# Patient Record
Sex: FEMALE | Race: OTHER | HISPANIC OR LATINO | URBAN - METROPOLITAN AREA
[De-identification: names, ages, dates, MRNs, and addresses within clinical notes are randomized per-mention and may not be internally consistent; named-entity substitution may affect disease eponyms.]

---

## 2021-12-20 VITALS
HEIGHT: 65 IN | TEMPERATURE: 99 F | WEIGHT: 195.11 LBS | RESPIRATION RATE: 18 BRPM | HEART RATE: 51 BPM | OXYGEN SATURATION: 99 % | DIASTOLIC BLOOD PRESSURE: 62 MMHG | SYSTOLIC BLOOD PRESSURE: 124 MMHG

## 2021-12-20 RX ORDER — CHLORHEXIDINE GLUCONATE 213 G/1000ML
1 SOLUTION TOPICAL ONCE
Refills: 0 | Status: DISCONTINUED | OUTPATIENT
Start: 2021-12-22 | End: 2022-01-05

## 2021-12-20 NOTE — H&P ADULT - HISTORY OF PRESENT ILLNESS
COVID-19 PCR:   Cardiologist: Dr. Franc Andres  Pharmacist:  Escort:    70 yo female with a PMHx of multivessel CAD (s/p cardiac catheterization 10/2021) presented to cardiologist Dr. Andres with the complaint of intermittent substernal chest discomfort   described as a dull  when walking more than 2 blocks or climbing one flight of stairs associated with SOB relieved with rest.  COVID-19 PCR:   Cardiologist: Dr. Franc Andres  Pharmacist:  Escort:    70 yo female with a PMHx of known CAD s/p diagnostic cath (10/2021 in ) revealing 95% mLAD, 90%mLcx disease, hyperlipidemia, DM, IBS, HTN, hypothyroidism, presented to cardiologist Dr. Andres with the complaint of intermittent substernal chest discomfort described as a dull when walking more than 2 blocks and/or climbing one flight of stairs associated with SOB relieved with rest x several months. Patient denies leg edema, PND, orthopnea, palpitations, dizziness, syncope, N/V. Per MD office note Echo (6/2021) revealed normal BiV function, trace MR/TR, EF 63% and Carotid US (5/2021) revealed mild plaque, patent flows b/l. Due to patient’s known CAD and persistent CCS Angina Class III Symptoms despite medical therapy, patient now presents for cardiac catheterization with possible intervention if clinically indicated.     Cardiac Cath History  October 2021 in : LM mild, mLAD 95%, mLCx 90%, RCA PDA/PL diffuse disease, overall small diabetic appearing vessels, normal LVEF. Of note: Right radial approach aborted to R femoral, which ultimately led to large hematoma.   COVID-19 PCR: 12/17 Neg in chart  Cardiologist: Dr. Franc Andres  Pharmacy: [facility]  Escort: Daughter     Meds confirmed with pharmacy     72 yo Bangladeshi speaking female with a PMHx of known CAD s/p diagnostic cath (10/2021 in ) revealing 95% mLAD, 90%mLcx disease, hyperlipidemia, DM, IBS, HTN, hypothyroidism, presented to cardiologist Dr. Andres with the complaint of intermittent substernal chest discomfort described as a dull when walking more than 2 blocks and/or climbing one flight of stairs associated with SOB relieved with rest x several months. Patient denies leg edema, PND, orthopnea, palpitations, dizziness, syncope, N/V. Per MD office note Echo (6/2021) revealed normal BiV function, trace MR/TR, EF 63% and Carotid US (5/2021) revealed mild plaque, patent flows b/l. Due to patient’s known CAD and persistent CCS Angina Class III Symptoms despite medical therapy, patient now presents for cardiac catheterization with possible intervention if clinically indicated.     Cardiac Cath History  October 2021 in : LM mild, mLAD 95%, mLCx 90%, RCA PDA/PL diffuse disease, overall small diabetic appearing vessels, normal LVEF. Of note: Right radial approach aborted to R femoral, which ultimately led to large hematoma.

## 2021-12-20 NOTE — H&P ADULT - NSICDXPASTMEDICALHX_GEN_ALL_CORE_FT
PAST MEDICAL HISTORY:  CAD (coronary artery disease)      PAST MEDICAL HISTORY:  CAD (coronary artery disease)     Diabetes     Hyperlipidemia     Hypertension

## 2021-12-20 NOTE — H&P ADULT - RS GEN PE MLT RESP DETAILS PC
respirations non-labored/clear to auscultation bilaterally/no intercostal retractions/no rales/no rhonchi

## 2021-12-20 NOTE — H&P ADULT - ASSESSMENT
72 yo Bulgarian speaking female with a PMHx of known CAD s/p diagnostic cath (10/2021 in ) revealing 95% mLAD, 90%mLcx disease, hyperlipidemia, DM, IBS, HTN, hypothyroidism who in light of patient’s known CAD and persistent CCS Angina Class III Symptoms despite medical therapy, patient now presents for cardiac catheterization with possible intervention if clinically indicated.     ASA II, Mallampati II    Hgb/HCT: 10.9/35.5. (12.0/37.6 on 12/17 per outpatient labs). Pt denies bleeding, melena, BRBPR, hematuria. Pt reports compliance with daily Aspirin 81mg PO QD, last dose 12/20/21. Ordered home dose Aspirin and Plavix 600mg PO x 1.   NS @ 266 cc/hr ordered. EF 63% by ECHO. Pt is euvolemic on exam. Cr. 1.31. (1.4 by outpatient labs 12/17)    Pt is a candidate for moderate sedation: Yes    Risks & benefits of procedure and alternative therapy have been explained to the patient including but not limited to: allergic reaction, bleeding w/possible need for blood transfusion, infection, renal and vascular compromise, limb damage, arrhythmia, stroke, vessel dissection/perforation, Myocardial infarction, emergent CABG. Informed consent obtained and in chart.

## 2021-12-20 NOTE — H&P ADULT - NSHPLABSRESULTS_GEN_ALL_CORE
ECG: SB @ 50bpm with 1st degree AVB, TWI in I-II, V3-V6, biphasic T in V2.                          10.9   10.69 )-----------( 372      ( 22 Dec 2021 12:16 )             35.5       12-22    136  |  99  |  30<H>  ----------------------------<  123<H>  4.6   |  28  |  1.31<H>    Ca    9.4      22 Dec 2021 12:16    TPro  7.4  /  Alb  4.5  /  TBili  0.2  /  DBili  x   /  AST  27  /  ALT  20  /  AlkPhos  62  12-22      PT/INR - ( 22 Dec 2021 12:16 )   PT: 11.7 sec;   INR: 0.97          PTT - ( 22 Dec 2021 12:16 )  PTT:29.5 sec    CARDIAC MARKERS ( 22 Dec 2021 12:16 )  x     / x     / 101 U/L / x     / x

## 2021-12-22 ENCOUNTER — OUTPATIENT (OUTPATIENT)
Dept: OUTPATIENT SERVICES | Facility: HOSPITAL | Age: 71
LOS: 1 days | Discharge: HOME | End: 2021-12-22
Payer: MEDICARE

## 2021-12-22 DIAGNOSIS — Z98.890 OTHER SPECIFIED POSTPROCEDURAL STATES: Chronic | ICD-10-CM

## 2021-12-22 DIAGNOSIS — Z98.891 HISTORY OF UTERINE SCAR FROM PREVIOUS SURGERY: Chronic | ICD-10-CM

## 2021-12-22 LAB
A1C WITH ESTIMATED AVERAGE GLUCOSE RESULT: 7.3 % — HIGH (ref 4–5.6)
ALBUMIN SERPL ELPH-MCNC: 4.5 G/DL — SIGNIFICANT CHANGE UP (ref 3.3–5)
ALP SERPL-CCNC: 62 U/L — SIGNIFICANT CHANGE UP (ref 40–120)
ALT FLD-CCNC: 20 U/L — SIGNIFICANT CHANGE UP (ref 10–45)
ANION GAP SERPL CALC-SCNC: 7 MMOL/L — SIGNIFICANT CHANGE UP (ref 5–17)
ANION GAP SERPL CALC-SCNC: 9 MMOL/L — SIGNIFICANT CHANGE UP (ref 5–17)
APTT BLD: 29.5 SEC — SIGNIFICANT CHANGE UP (ref 27.5–35.5)
AST SERPL-CCNC: 27 U/L — SIGNIFICANT CHANGE UP (ref 10–40)
BASOPHILS # BLD AUTO: 0.04 K/UL — SIGNIFICANT CHANGE UP (ref 0–0.2)
BASOPHILS NFR BLD AUTO: 0.4 % — SIGNIFICANT CHANGE UP (ref 0–2)
BILIRUB SERPL-MCNC: 0.2 MG/DL — SIGNIFICANT CHANGE UP (ref 0.2–1.2)
BUN SERPL-MCNC: 23 MG/DL — SIGNIFICANT CHANGE UP (ref 7–23)
BUN SERPL-MCNC: 30 MG/DL — HIGH (ref 7–23)
CALCIUM SERPL-MCNC: 7.2 MG/DL — LOW (ref 8.4–10.5)
CALCIUM SERPL-MCNC: 9.4 MG/DL — SIGNIFICANT CHANGE UP (ref 8.4–10.5)
CHLORIDE SERPL-SCNC: 108 MMOL/L — SIGNIFICANT CHANGE UP (ref 96–108)
CHLORIDE SERPL-SCNC: 99 MMOL/L — SIGNIFICANT CHANGE UP (ref 96–108)
CHOLEST SERPL-MCNC: 95 MG/DL — SIGNIFICANT CHANGE UP
CK SERPL-CCNC: 101 U/L — SIGNIFICANT CHANGE UP (ref 25–170)
CO2 SERPL-SCNC: 24 MMOL/L — SIGNIFICANT CHANGE UP (ref 22–31)
CO2 SERPL-SCNC: 28 MMOL/L — SIGNIFICANT CHANGE UP (ref 22–31)
CREAT SERPL-MCNC: 1.16 MG/DL — SIGNIFICANT CHANGE UP (ref 0.5–1.3)
CREAT SERPL-MCNC: 1.31 MG/DL — HIGH (ref 0.5–1.3)
CRP SERPL-MCNC: <3 MG/L — SIGNIFICANT CHANGE UP (ref 0–4)
EOSINOPHIL # BLD AUTO: 0.27 K/UL — SIGNIFICANT CHANGE UP (ref 0–0.5)
EOSINOPHIL NFR BLD AUTO: 2.5 % — SIGNIFICANT CHANGE UP (ref 0–6)
ESTIMATED AVERAGE GLUCOSE: 163 MG/DL — HIGH (ref 68–114)
GLUCOSE BLDC GLUCOMTR-MCNC: 62 MG/DL — LOW (ref 70–99)
GLUCOSE SERPL-MCNC: 123 MG/DL — HIGH (ref 70–99)
GLUCOSE SERPL-MCNC: 155 MG/DL — HIGH (ref 70–99)
HCT VFR BLD CALC: 32.5 % — LOW (ref 34.5–45)
HCT VFR BLD CALC: 35.5 % — SIGNIFICANT CHANGE UP (ref 34.5–45)
HCV AB S/CO SERPL IA: 0.04 S/CO — SIGNIFICANT CHANGE UP
HCV AB SERPL-IMP: SIGNIFICANT CHANGE UP
HDLC SERPL-MCNC: 47 MG/DL — LOW
HGB BLD-MCNC: 10.1 G/DL — LOW (ref 11.5–15.5)
HGB BLD-MCNC: 10.9 G/DL — LOW (ref 11.5–15.5)
IMM GRANULOCYTES NFR BLD AUTO: 0.3 % — SIGNIFICANT CHANGE UP (ref 0–1.5)
INR BLD: 0.97 — SIGNIFICANT CHANGE UP (ref 0.88–1.16)
LIPID PNL WITH DIRECT LDL SERPL: 23 MG/DL — SIGNIFICANT CHANGE UP
LYMPHOCYTES # BLD AUTO: 3.77 K/UL — HIGH (ref 1–3.3)
LYMPHOCYTES # BLD AUTO: 35.3 % — SIGNIFICANT CHANGE UP (ref 13–44)
MAGNESIUM SERPL-MCNC: 1.6 MG/DL — SIGNIFICANT CHANGE UP (ref 1.6–2.6)
MCHC RBC-ENTMCNC: 28.4 PG — SIGNIFICANT CHANGE UP (ref 27–34)
MCHC RBC-ENTMCNC: 28.9 PG — SIGNIFICANT CHANGE UP (ref 27–34)
MCHC RBC-ENTMCNC: 30.7 GM/DL — LOW (ref 32–36)
MCHC RBC-ENTMCNC: 31.1 GM/DL — LOW (ref 32–36)
MCV RBC AUTO: 92.4 FL — SIGNIFICANT CHANGE UP (ref 80–100)
MCV RBC AUTO: 93.1 FL — SIGNIFICANT CHANGE UP (ref 80–100)
MONOCYTES # BLD AUTO: 0.57 K/UL — SIGNIFICANT CHANGE UP (ref 0–0.9)
MONOCYTES NFR BLD AUTO: 5.3 % — SIGNIFICANT CHANGE UP (ref 2–14)
NEUTROPHILS # BLD AUTO: 6.01 K/UL — SIGNIFICANT CHANGE UP (ref 1.8–7.4)
NEUTROPHILS NFR BLD AUTO: 56.2 % — SIGNIFICANT CHANGE UP (ref 43–77)
NON HDL CHOLESTEROL: 48 MG/DL — SIGNIFICANT CHANGE UP
NRBC # BLD: 0 /100 WBCS — SIGNIFICANT CHANGE UP (ref 0–0)
NRBC # BLD: 0 /100 WBCS — SIGNIFICANT CHANGE UP (ref 0–0)
PLATELET # BLD AUTO: 330 K/UL — SIGNIFICANT CHANGE UP (ref 150–400)
PLATELET # BLD AUTO: 372 K/UL — SIGNIFICANT CHANGE UP (ref 150–400)
POTASSIUM SERPL-MCNC: 3.5 MMOL/L — SIGNIFICANT CHANGE UP (ref 3.5–5.3)
POTASSIUM SERPL-MCNC: 4.6 MMOL/L — SIGNIFICANT CHANGE UP (ref 3.5–5.3)
POTASSIUM SERPL-SCNC: 3.5 MMOL/L — SIGNIFICANT CHANGE UP (ref 3.5–5.3)
POTASSIUM SERPL-SCNC: 4.6 MMOL/L — SIGNIFICANT CHANGE UP (ref 3.5–5.3)
PROT SERPL-MCNC: 7.4 G/DL — SIGNIFICANT CHANGE UP (ref 6–8.3)
PROTHROM AB SERPL-ACNC: 11.7 SEC — SIGNIFICANT CHANGE UP (ref 10.6–13.6)
RBC # BLD: 3.49 M/UL — LOW (ref 3.8–5.2)
RBC # BLD: 3.84 M/UL — SIGNIFICANT CHANGE UP (ref 3.8–5.2)
RBC # FLD: 14.1 % — SIGNIFICANT CHANGE UP (ref 10.3–14.5)
RBC # FLD: 14.3 % — SIGNIFICANT CHANGE UP (ref 10.3–14.5)
SODIUM SERPL-SCNC: 136 MMOL/L — SIGNIFICANT CHANGE UP (ref 135–145)
SODIUM SERPL-SCNC: 139 MMOL/L — SIGNIFICANT CHANGE UP (ref 135–145)
TRIGL SERPL-MCNC: 124 MG/DL — SIGNIFICANT CHANGE UP
WBC # BLD: 10.69 K/UL — HIGH (ref 3.8–10.5)
WBC # BLD: 8.54 K/UL — SIGNIFICANT CHANGE UP (ref 3.8–10.5)
WBC # FLD AUTO: 10.69 K/UL — HIGH (ref 3.8–10.5)
WBC # FLD AUTO: 8.54 K/UL — SIGNIFICANT CHANGE UP (ref 3.8–10.5)

## 2021-12-22 PROCEDURE — 82550 ASSAY OF CK (CPK): CPT

## 2021-12-22 PROCEDURE — 92928 PRQ TCAT PLMT NTRAC ST 1 LES: CPT | Mod: 1L,LD

## 2021-12-22 PROCEDURE — C1874: CPT

## 2021-12-22 PROCEDURE — 86803 HEPATITIS C AB TEST: CPT

## 2021-12-22 PROCEDURE — C1725: CPT

## 2021-12-22 PROCEDURE — 93458 L HRT ARTERY/VENTRICLE ANGIO: CPT | Mod: 59

## 2021-12-22 PROCEDURE — C1887: CPT

## 2021-12-22 PROCEDURE — 36415 COLL VENOUS BLD VENIPUNCTURE: CPT

## 2021-12-22 PROCEDURE — 93005 ELECTROCARDIOGRAM TRACING: CPT

## 2021-12-22 PROCEDURE — 99152 MOD SED SAME PHYS/QHP 5/>YRS: CPT

## 2021-12-22 PROCEDURE — 83735 ASSAY OF MAGNESIUM: CPT

## 2021-12-22 PROCEDURE — 80053 COMPREHEN METABOLIC PANEL: CPT

## 2021-12-22 PROCEDURE — 86140 C-REACTIVE PROTEIN: CPT

## 2021-12-22 PROCEDURE — 85027 COMPLETE CBC AUTOMATED: CPT

## 2021-12-22 PROCEDURE — 82962 GLUCOSE BLOOD TEST: CPT

## 2021-12-22 PROCEDURE — 99153 MOD SED SAME PHYS/QHP EA: CPT

## 2021-12-22 PROCEDURE — 85730 THROMBOPLASTIN TIME PARTIAL: CPT

## 2021-12-22 PROCEDURE — 99152 MOD SED SAME PHYS/QHP 5/>YRS: CPT | Mod: 1L

## 2021-12-22 PROCEDURE — 80061 LIPID PANEL: CPT

## 2021-12-22 PROCEDURE — 85025 COMPLETE CBC W/AUTO DIFF WBC: CPT

## 2021-12-22 PROCEDURE — 85610 PROTHROMBIN TIME: CPT

## 2021-12-22 PROCEDURE — 93458 L HRT ARTERY/VENTRICLE ANGIO: CPT | Mod: 26,1L,59

## 2021-12-22 PROCEDURE — C1769: CPT

## 2021-12-22 PROCEDURE — 92978 ENDOLUMINL IVUS OCT C 1ST: CPT | Mod: LD

## 2021-12-22 PROCEDURE — 92978 ENDOLUMINL IVUS OCT C 1ST: CPT | Mod: 26,1L,LD

## 2021-12-22 PROCEDURE — C9600: CPT | Mod: LD

## 2021-12-22 PROCEDURE — C1753: CPT

## 2021-12-22 PROCEDURE — 83036 HEMOGLOBIN GLYCOSYLATED A1C: CPT

## 2021-12-22 PROCEDURE — 93010 ELECTROCARDIOGRAM REPORT: CPT | Mod: 76

## 2021-12-22 PROCEDURE — 80048 BASIC METABOLIC PNL TOTAL CA: CPT

## 2021-12-22 RX ORDER — ASPIRIN/CALCIUM CARB/MAGNESIUM 324 MG
1 TABLET ORAL
Qty: 0 | Refills: 0 | DISCHARGE

## 2021-12-22 RX ORDER — SODIUM CHLORIDE 9 MG/ML
1000 INJECTION, SOLUTION INTRAVENOUS
Refills: 0 | Status: DISCONTINUED | OUTPATIENT
Start: 2021-12-22 | End: 2022-01-05

## 2021-12-22 RX ORDER — DEXTROSE 50 % IN WATER 50 %
25 SYRINGE (ML) INTRAVENOUS ONCE
Refills: 0 | Status: DISCONTINUED | OUTPATIENT
Start: 2021-12-22 | End: 2022-01-05

## 2021-12-22 RX ORDER — GLUCAGON INJECTION, SOLUTION 0.5 MG/.1ML
1 INJECTION, SOLUTION SUBCUTANEOUS ONCE
Refills: 0 | Status: DISCONTINUED | OUTPATIENT
Start: 2021-12-22 | End: 2022-01-05

## 2021-12-22 RX ORDER — ASPIRIN/CALCIUM CARB/MAGNESIUM 324 MG
1 TABLET ORAL
Qty: 30 | Refills: 11
Start: 2021-12-22 | End: 2022-12-16

## 2021-12-22 RX ORDER — SODIUM CHLORIDE 9 MG/ML
500 INJECTION INTRAMUSCULAR; INTRAVENOUS; SUBCUTANEOUS
Refills: 0 | Status: DISCONTINUED | OUTPATIENT
Start: 2021-12-22 | End: 2022-01-05

## 2021-12-22 RX ORDER — DOCUSATE SODIUM 100 MG
1 CAPSULE ORAL
Qty: 0 | Refills: 0 | DISCHARGE

## 2021-12-22 RX ORDER — DEXTROSE 50 % IN WATER 50 %
15 SYRINGE (ML) INTRAVENOUS ONCE
Refills: 0 | Status: DISCONTINUED | OUTPATIENT
Start: 2021-12-22 | End: 2022-01-05

## 2021-12-22 RX ORDER — CLOPIDOGREL BISULFATE 75 MG/1
600 TABLET, FILM COATED ORAL ONCE
Refills: 0 | Status: COMPLETED | OUTPATIENT
Start: 2021-12-22 | End: 2021-12-22

## 2021-12-22 RX ORDER — CLOPIDOGREL BISULFATE 75 MG/1
0 TABLET, FILM COATED ORAL
Qty: 30 | Refills: 11
Start: 2021-12-22 | End: 2022-12-16

## 2021-12-22 RX ORDER — POLYETHYLENE GLYCOL 3350 17 G/17G
0 POWDER, FOR SOLUTION ORAL
Qty: 0 | Refills: 0 | DISCHARGE

## 2021-12-22 RX ORDER — ASPIRIN/CALCIUM CARB/MAGNESIUM 324 MG
81 TABLET ORAL ONCE
Refills: 0 | Status: COMPLETED | OUTPATIENT
Start: 2021-12-22 | End: 2021-12-22

## 2021-12-22 RX ORDER — INSULIN LISPRO 100/ML
VIAL (ML) SUBCUTANEOUS ONCE
Refills: 0 | Status: DISCONTINUED | OUTPATIENT
Start: 2021-12-22 | End: 2022-01-05

## 2021-12-22 RX ORDER — INSULIN GLARGINE 100 [IU]/ML
0 INJECTION, SOLUTION SUBCUTANEOUS
Qty: 0 | Refills: 0 | DISCHARGE

## 2021-12-22 RX ORDER — LINACLOTIDE 145 UG/1
1 CAPSULE, GELATIN COATED ORAL
Qty: 0 | Refills: 0 | DISCHARGE

## 2021-12-22 RX ORDER — CLOPIDOGREL BISULFATE 75 MG/1
1 TABLET, FILM COATED ORAL
Qty: 30 | Refills: 11
Start: 2021-12-22 | End: 2022-12-16

## 2021-12-22 RX ORDER — DEXTROSE 50 % IN WATER 50 %
12.5 SYRINGE (ML) INTRAVENOUS ONCE
Refills: 0 | Status: DISCONTINUED | OUTPATIENT
Start: 2021-12-22 | End: 2022-01-05

## 2021-12-22 RX ADMIN — SODIUM CHLORIDE 266 MILLILITER(S): 9 INJECTION INTRAMUSCULAR; INTRAVENOUS; SUBCUTANEOUS at 13:23

## 2021-12-22 RX ADMIN — CLOPIDOGREL BISULFATE 600 MILLIGRAM(S): 75 TABLET, FILM COATED ORAL at 13:22

## 2021-12-22 RX ADMIN — Medication 81 MILLIGRAM(S): at 13:22

## 2021-12-22 NOTE — PROGRESS NOTE ADULT - SUBJECTIVE AND OBJECTIVE BOX
Interventional Cardiology PA Post PCI SDA Discharge Note    Patient without complaints. Ambulated and voided without difficulties    Afebrile, VSS    PRESCRIPTIONS/HOME MEDICATIONS:  Aspirin Enteric Coated 81 mg oral delayed release tablet: 1 tab(s) orally once a day  Basaglar KwikPen: 30 unit(s) subcutaneous once a day (at bedtime)  Coreg 12.5 mg oral tablet: 1 tab(s) orally 2 times a day  Crestor 20 mg oral tablet: 1 tab(s) orally once a day  hydrALAZINE 25 mg oral tablet: 1 tab(s) orally 2 times a day  levothyroxine 150 mcg (0.15 mg) oral capsule: 1 cap(s) orally once a day  Linzess 145 mcg oral capsule: 1 cap(s) orally once a day  losartan 100 mg oral tablet: 1 tab(s) orally once a day  magnesium oxide 140 mg oral capsule: 1 cap(s) orally 3 times a day  meclizine 25 mg oral tablet: 1 tab(s) orally 2 times a day  Norvasc 10 mg oral tablet: 1 tab(s) orally once a day  omeprazole 40 mg oral delayed release capsule: 1 cap(s) orally once a day  Vitamin D3 25 mcg (1000 intl units) oral tablet: 1 tab(s) orally once a day  Zetia 10 mg oral tablet: 1 tab(s) orally once a day      CURRENT MEDICATIONS:   chlorhexidine 4% Liquid 1 Application(s) Topical once  dextrose 40% Gel 15 Gram(s) Oral Once  dextrose 5%. 1000 milliLiter(s) IV Continuous <Continuous>  dextrose 5%. 1000 milliLiter(s) IV Continuous <Continuous>  dextrose 50% Injectable 25 Gram(s) IV Push Once  dextrose 50% Injectable 12.5 Gram(s) IV Push Once  dextrose 50% Injectable 25 Gram(s) IV Push Once  glucagon  Injectable 1 milliGRAM(s) IntraMuscular Once  insulin lispro (ADMELOG) corrective regimen sliding scale   SubCutaneous Once  sodium chloride 0.9%. 500 milliLiter(s) IV Continuous <Continuous>  sodium chloride 0.9%. 500 milliLiter(s) IV Continuous <Continuous>      Ext:  Right Radial : No hematoma,     bleeding, dressing; C/D/I    Pulses:    intact RADto baseline     A/P: 72 yo Maldivian speaking female with a PMHx of known CAD s/p diagnostic cath (10/2021 in ) revealing 95% mLAD, 90%mLcx disease, hyperlipidemia, DM, IBS, HTN, hypothyroidism, presented to cardiologist Dr. Andres with the complaint of intermittent substernal chest discomfort described as a dull when walking more than 2 blocks and/or climbing one flight of stairs associated with SOB relieved with rest x several months. Patient denies leg edema, PND, orthopnea, palpitations, dizziness, syncope, N/V. Per MD office note Echo (6/2021) revealed normal BiV function, trace MR/TR, EF 63% and Carotid US (5/2021) revealed mild plaque, patent flows b/l. Due to patient’s known CAD and persistent CCS Angina Class III Symptoms despite medical therapy, patient now presents for cardiac catheterization with possible intervention if clinically indicated.     Patient is s/p PCI: EVELIO x 1 mLAD (90%) diffuse, LM: normal, pLAD: 30% ectatic, dLAD: mild diffuse disease, pLCx: 70% diffuse, pOM1: 95% small average vessel, OM2: 80% stenosis, Ramus: 2 tandem 80% stenosis, RCA: mild diffuse disease, RPDA: fistally diffuse severe disease, RPL: distally diffuse severe disease, EDP: 12 mmHg, No LV-Ao gram. EF 63% as per echo. R TR due at 6:30 PM.     1.       Follow-up with Cardiologist, Dr. Andres,  in 72 hours.  2.       Post procedure labs/EKG reviewed and stable.    3.       Pt given instructions on importance of taking antiplatelet medication.    4.       Stable for discharge as per attending Dr. Andres, after bed rest, pt voids, wrist stable and 30 minutes of ambulation.  5.        Prescriptions for Aspirin/Plavix e-prescribed and submitted to patient's pharmacy Yes  6.        Patient will continue Statin - Crestor 10 mg and Zetia 10 mg  7.       Patient will continue All Other Home Medications.   8.       Is patient a Current Smoker: No?   9.	   Discharge forms signed and copies in chart   10.       Discharge Order Entered Yes

## 2021-12-27 DIAGNOSIS — I25.110 ATHEROSCLEROTIC HEART DISEASE OF NATIVE CORONARY ARTERY WITH UNSTABLE ANGINA PECTORIS: ICD-10-CM

## 2021-12-27 DIAGNOSIS — I25.84 CORONARY ATHEROSCLEROSIS DUE TO CALCIFIED CORONARY LESION: ICD-10-CM

## 2022-02-12 PROBLEM — E11.9 TYPE 2 DIABETES MELLITUS WITHOUT COMPLICATIONS: Chronic | Status: ACTIVE | Noted: 2021-12-20

## 2022-02-12 PROBLEM — I10 ESSENTIAL (PRIMARY) HYPERTENSION: Chronic | Status: ACTIVE | Noted: 2021-12-20

## 2022-02-12 PROBLEM — I25.10 ATHEROSCLEROTIC HEART DISEASE OF NATIVE CORONARY ARTERY WITHOUT ANGINA PECTORIS: Chronic | Status: ACTIVE | Noted: 2021-12-20

## 2022-02-12 PROBLEM — E78.5 HYPERLIPIDEMIA, UNSPECIFIED: Chronic | Status: ACTIVE | Noted: 2021-12-20

## 2022-02-15 VITALS
TEMPERATURE: 98 F | HEART RATE: 65 BPM | WEIGHT: 197.98 LBS | DIASTOLIC BLOOD PRESSURE: 67 MMHG | OXYGEN SATURATION: 98 % | SYSTOLIC BLOOD PRESSURE: 121 MMHG | HEIGHT: 65 IN | RESPIRATION RATE: 18 BRPM

## 2022-02-15 RX ORDER — CHLORHEXIDINE GLUCONATE 213 G/1000ML
1 SOLUTION TOPICAL ONCE
Refills: 0 | Status: DISCONTINUED | OUTPATIENT
Start: 2022-02-23 | End: 2022-03-09

## 2022-02-15 NOTE — H&P ADULT - HISTORY OF PRESENT ILLNESS
History obtained from last hospitalization note & outpatient [facility] Cardiology note    COVID-19 PCR:   Cardiologist: Dr. Franc Andres  Pharmacy: [facility]  Escort: Daughter     Meds confirmed with pharmacy last admit, confirm that meds haven't changed    71 y/o Indonesian speaking F, PMHx HTN, HLD, DM, CAD (last cath @ [facility] 12/22/21: m/d LAD 90% s/p EVELIO x2 with residual mLCx 80% just after severely diffused OM1 extending into larger OM2 80%, p/m Ramus 90%, RPDA & RPLs with p/m vessel 70-90%), IBS, hypothyroidism, who initially presented to Cardiologist Dr. Andres c/o intermittent, CCP described as dull l when walking >2 blocks and/or climbing one flight of stairs a/w GUAMAN, relieved with rest x several months.  Since last procedure, pt continues to endorse CP/GUAMAN on >2 city blocks. Per MD office note Echo (6/2021) revealed normal BiV function, trace MR/TR, EF 63% and Carotid US (5/2021) revealed mild plaque, patent flows b/l. In light of pt's risk factors, persistent CCS Angina Class II Symptoms despite medical therapy, known physiologically significant CAD,  patient presented for staged PCI of LCx & Ramus followed by rPDA/rPL at later date.     with residual mLCx 80% just after severely diffused OM1 extending into larger OM2 80%, p/m Ramus 90%, RPDA & RPLs with p/m vessel 70-90%)    Cardiac Cath History  October 2021 in DR: LM mild, mLAD 95%, mLCx 90%, RCA PDA/PL diffuse disease, overall small diabetic appearing vessels, normal LVEF. Of note: Right radial approach aborted to R femoral, which ultimately led to large hematoma. History obtained from last hospitalization note & outpatient [facility] Cardiology note    COVID-19 PCR:   Cardiologist: Dr. Franc Andres  Pharmacy: [facility]  Escort: Daughter     73 y/o Mohawk speaking F, PMHx HTN, HLD, DM, CAD (last cath @ [facility] 12/22/21: m/d LAD 90% s/p EVELIO x2 with residual mLCx 80% just after severely diffused OM1 extending into larger OM2 80%, p/m Ramus 90%, RPDA & RPLs with p/m vessel 70-90%), IBS, hypothyroidism, who initially presented to Cardiologist Dr. Andres c/o intermittent, CCP described as dull l when walking >2 blocks and/or climbing one flight of stairs a/w GUAMAN, relieved with rest x several months.  Since last procedure, pt continues to endorse CP/GUAMAN on >2 city blocks. Per MD office note Echo (6/2021) revealed normal BiV function, trace MR/TR, EF 63% and Carotid US (5/2021) revealed mild plaque, patent flows b/l. In light of pt's risk factors, persistent CCS Angina Class II Symptoms despite medical therapy, known physiologically significant CAD,  patient presented for staged PCI of LCx & Ramus followed by rPDA/rPL at later date.     with residual mLCx 80% just after severely diffused OM1 extending into larger OM2 80%, p/m Ramus 90%, RPDA & RPLs with p/m vessel 70-90%)    Cardiac Cath History  October 2021 in DR: LM mild, mLAD 95%, mLCx 90%, RCA PDA/PL diffuse disease, overall small diabetic appearing vessels, normal LVEF. Of note: Right radial approach aborted to R femoral, which ultimately led to large hematoma. History obtained from last hospitalization note & outpatient [facility] Cardiology note    COVID-19 PCR: Neg 2/19/2022 (in chart)  Cardiologist: Dr. Franc Andres  [facility]: [facility]  Escort: Daughter     73 y/o Azeri speaking F, PMHx HTN, HLD, DM, CAD (last cath @ [facility] 12/22/21: m/d LAD 90% s/p EVELIO x2 with residual mLCx 80% just after severely diffused OM1 extending into larger OM2 80%, p/m Ramus 90%, RPDA & RPLs with p/m vessel 70-90%), IBS, hypothyroidism, who initially presented to Cardiologist Dr. Andres c/o intermittent, CCP described as dull l when walking >2 blocks and/or climbing one flight of stairs a/w GUAMAN, relieved with rest x several months.  Since last procedure, pt continues to endorse CP/GUAMAN on >2 city blocks. Per MD office note Echo (6/2021) revealed normal BiV function, trace MR/TR, EF 63% and Carotid US (5/2021) revealed mild plaque, patent flows b/l. In light of pt's risk factors, persistent CCS Angina Class II Symptoms despite medical therapy, known physiologically significant CAD,  patient presented for staged PCI of LCx & Ramus followed by rPDA/rPL at later date.     with residual mLCx 80% just after severely diffused OM1 extending into larger OM2 80%, p/m Ramus 90%, RPDA & RPLs with p/m vessel 70-90%)    Cardiac Cath History  October 2021 in DR: LM mild, mLAD 95%, mLCx 90%, RCA PDA/PL diffuse disease, overall small diabetic appearing vessels, normal LVEF. Of note: Right radial approach aborted to R femoral, which ultimately led to large hematoma.

## 2022-02-15 NOTE — H&P ADULT - NSICDXPASTMEDICALHX_GEN_ALL_CORE_FT
PAST MEDICAL HISTORY:  CAD (coronary artery disease)     Diabetes     Hyperlipidemia     Hypertension

## 2022-02-15 NOTE — H&P ADULT - ASSESSMENT
71 y/o Divehi speaking F, PMHx HTN, HLD, DM, CAD (last cath @ [facility] 12/22/21: m/d LAD 90% s/p EVELIO x2 with residual mLCx 80% just after severely diffused OM1 extending into larger OM2 80%, p/m Ramus 90%, RPDA & RPLs with p/m vessel 70-90%), IBS, hypothyroidism, who initially presented to the cardiac cath for stage PCI of LCx and Ramus in light of pt's risk factors, persistent CCS Angina Class II Symptoms despite medical therapy, known physiologically significant CAD.         ASA III			Mallampati class: III         Anginal Class: II    Consent obtained with Language Line # [id number]  Sedation Plan:   [  ] None   [x] Moderate   [  ]  Deep    [  ]  General Anesthesia   Patient Is Suitable Candidate For Sedation?     [x] Yes   [  ] No   [  ] Not Applicable   Cath Order Entered: [x] Yes  DAPT LOAD Ordered: [  ] Yes  [x] No load 2/2 pt takes Aspirin 81mg daily, last dose yesterday 2/22/2022 and takes Brilinta 90mg BID, last dose this morning, give Aspirin 81mg x 1. H/H 12.21/39.4 stable   Pre-Cath fluids Ordered: [x] Yes  BUN/Cr 26/1.27, IVF NS @ 270cc/hr x 2 hour     Risks & benefits of procedure and sedation and risks and benefits for the alternative therapy have been explained to the patient and/or HCP in layman’s terms including but not limited to: allergic reaction, bleeding, infection, arrhythmia, respiratory compromise, renal and vascular compromise, limb damage, MI, CVA, emergent CABG/Vascular Surgery and death. Informed consent obtained and in chart.

## 2022-02-15 NOTE — H&P ADULT - NSHPLABSRESULTS_GEN_ALL_CORE
12.2   9.51  )-----------( 355      ( 23 Feb 2022 11:10 )             39.4       02-23    142  |  104  |  26<H>  ----------------------------<  79  4.3   |  28  |  1.27    Ca    9.5      23 Feb 2022 11:10    TPro  7.5  /  Alb  4.6  /  TBili  0.2  /  DBili  x   /  AST  18  /  ALT  14  /  AlkPhos  62  02-23          CARDIAC MARKERS ( 23 Feb 2022 11:10 )  x     / x     / 85 U/L / x     / 1.8 ng/mL            EKG: NSR with 1st degree 12.2   9.51  )-----------( 355      ( 23 Feb 2022 11:10 )             39.4       02-23    142  |  104  |  26<H>  ----------------------------<  79  4.3   |  28  |  1.27    Ca    9.5      23 Feb 2022 11:10    TPro  7.5  /  Alb  4.6  /  TBili  0.2  /  DBili  x   /  AST  18  /  ALT  14  /  AlkPhos  62  02-23          CARDIAC MARKERS ( 23 Feb 2022 11:10 )  x     / x     / 85 U/L / x     / 1.8 ng/mL            EKG: NSR with 1st degree, Inverted Twave v5

## 2022-02-23 ENCOUNTER — OUTPATIENT (OUTPATIENT)
Dept: OUTPATIENT SERVICES | Facility: HOSPITAL | Age: 72
LOS: 1 days | Discharge: HOME | End: 2022-02-23
Payer: MEDICARE

## 2022-02-23 DIAGNOSIS — Z98.891 HISTORY OF UTERINE SCAR FROM PREVIOUS SURGERY: Chronic | ICD-10-CM

## 2022-02-23 DIAGNOSIS — Z98.890 OTHER SPECIFIED POSTPROCEDURAL STATES: Chronic | ICD-10-CM

## 2022-02-23 LAB
A1C WITH ESTIMATED AVERAGE GLUCOSE RESULT: 7.4 % — HIGH (ref 4–5.6)
ALBUMIN SERPL ELPH-MCNC: 4.6 G/DL — SIGNIFICANT CHANGE UP (ref 3.3–5)
ALP SERPL-CCNC: 62 U/L — SIGNIFICANT CHANGE UP (ref 40–120)
ALT FLD-CCNC: 14 U/L — SIGNIFICANT CHANGE UP (ref 10–45)
ANION GAP SERPL CALC-SCNC: 10 MMOL/L — SIGNIFICANT CHANGE UP (ref 5–17)
ANION GAP SERPL CALC-SCNC: 11 MMOL/L — SIGNIFICANT CHANGE UP (ref 5–17)
APTT BLD: 29.4 SEC — SIGNIFICANT CHANGE UP (ref 27.5–35.5)
AST SERPL-CCNC: 18 U/L — SIGNIFICANT CHANGE UP (ref 10–40)
BASOPHILS # BLD AUTO: 0.03 K/UL — SIGNIFICANT CHANGE UP (ref 0–0.2)
BASOPHILS NFR BLD AUTO: 0.3 % — SIGNIFICANT CHANGE UP (ref 0–2)
BILIRUB SERPL-MCNC: 0.2 MG/DL — SIGNIFICANT CHANGE UP (ref 0.2–1.2)
BUN SERPL-MCNC: 23 MG/DL — SIGNIFICANT CHANGE UP (ref 7–23)
BUN SERPL-MCNC: 26 MG/DL — HIGH (ref 7–23)
CALCIUM SERPL-MCNC: 9.1 MG/DL — SIGNIFICANT CHANGE UP (ref 8.4–10.5)
CALCIUM SERPL-MCNC: 9.5 MG/DL — SIGNIFICANT CHANGE UP (ref 8.4–10.5)
CHLORIDE SERPL-SCNC: 102 MMOL/L — SIGNIFICANT CHANGE UP (ref 96–108)
CHLORIDE SERPL-SCNC: 104 MMOL/L — SIGNIFICANT CHANGE UP (ref 96–108)
CHOLEST SERPL-MCNC: 107 MG/DL — SIGNIFICANT CHANGE UP
CK MB CFR SERPL CALC: 1.8 NG/ML — SIGNIFICANT CHANGE UP (ref 0–6.7)
CK SERPL-CCNC: 85 U/L — SIGNIFICANT CHANGE UP (ref 25–170)
CO2 SERPL-SCNC: 25 MMOL/L — SIGNIFICANT CHANGE UP (ref 22–31)
CO2 SERPL-SCNC: 28 MMOL/L — SIGNIFICANT CHANGE UP (ref 22–31)
CREAT SERPL-MCNC: 1.2 MG/DL — SIGNIFICANT CHANGE UP (ref 0.5–1.3)
CREAT SERPL-MCNC: 1.27 MG/DL — SIGNIFICANT CHANGE UP (ref 0.5–1.3)
EOSINOPHIL # BLD AUTO: 0.3 K/UL — SIGNIFICANT CHANGE UP (ref 0–0.5)
EOSINOPHIL NFR BLD AUTO: 3.2 % — SIGNIFICANT CHANGE UP (ref 0–6)
ESTIMATED AVERAGE GLUCOSE: 166 MG/DL — HIGH (ref 68–114)
GLUCOSE BLDC GLUCOMTR-MCNC: 56 MG/DL — LOW (ref 70–99)
GLUCOSE BLDC GLUCOMTR-MCNC: 64 MG/DL — LOW (ref 70–99)
GLUCOSE BLDC GLUCOMTR-MCNC: 70 MG/DL — SIGNIFICANT CHANGE UP (ref 70–99)
GLUCOSE SERPL-MCNC: 143 MG/DL — HIGH (ref 70–99)
GLUCOSE SERPL-MCNC: 79 MG/DL — SIGNIFICANT CHANGE UP (ref 70–99)
HCT VFR BLD CALC: 37 % — SIGNIFICANT CHANGE UP (ref 34.5–45)
HCT VFR BLD CALC: 39.4 % — SIGNIFICANT CHANGE UP (ref 34.5–45)
HDLC SERPL-MCNC: 53 MG/DL — SIGNIFICANT CHANGE UP
HGB BLD-MCNC: 11.6 G/DL — SIGNIFICANT CHANGE UP (ref 11.5–15.5)
HGB BLD-MCNC: 12.2 G/DL — SIGNIFICANT CHANGE UP (ref 11.5–15.5)
IMM GRANULOCYTES NFR BLD AUTO: 0.2 % — SIGNIFICANT CHANGE UP (ref 0–1.5)
INR BLD: 0.92 — SIGNIFICANT CHANGE UP (ref 0.88–1.16)
LIPID PNL WITH DIRECT LDL SERPL: 25 MG/DL — SIGNIFICANT CHANGE UP
LYMPHOCYTES # BLD AUTO: 2.91 K/UL — SIGNIFICANT CHANGE UP (ref 1–3.3)
LYMPHOCYTES # BLD AUTO: 30.6 % — SIGNIFICANT CHANGE UP (ref 13–44)
MCHC RBC-ENTMCNC: 27.6 PG — SIGNIFICANT CHANGE UP (ref 27–34)
MCHC RBC-ENTMCNC: 28 PG — SIGNIFICANT CHANGE UP (ref 27–34)
MCHC RBC-ENTMCNC: 31 GM/DL — LOW (ref 32–36)
MCHC RBC-ENTMCNC: 31.4 GM/DL — LOW (ref 32–36)
MCV RBC AUTO: 89.1 FL — SIGNIFICANT CHANGE UP (ref 80–100)
MCV RBC AUTO: 89.4 FL — SIGNIFICANT CHANGE UP (ref 80–100)
MONOCYTES # BLD AUTO: 0.57 K/UL — SIGNIFICANT CHANGE UP (ref 0–0.9)
MONOCYTES NFR BLD AUTO: 6 % — SIGNIFICANT CHANGE UP (ref 2–14)
NEUTROPHILS # BLD AUTO: 5.68 K/UL — SIGNIFICANT CHANGE UP (ref 1.8–7.4)
NEUTROPHILS NFR BLD AUTO: 59.7 % — SIGNIFICANT CHANGE UP (ref 43–77)
NON HDL CHOLESTEROL: 54 MG/DL — SIGNIFICANT CHANGE UP
NRBC # BLD: 0 /100 WBCS — SIGNIFICANT CHANGE UP (ref 0–0)
NRBC # BLD: 0 /100 WBCS — SIGNIFICANT CHANGE UP (ref 0–0)
PLATELET # BLD AUTO: 334 K/UL — SIGNIFICANT CHANGE UP (ref 150–400)
PLATELET # BLD AUTO: 355 K/UL — SIGNIFICANT CHANGE UP (ref 150–400)
POTASSIUM SERPL-MCNC: 3.8 MMOL/L — SIGNIFICANT CHANGE UP (ref 3.5–5.3)
POTASSIUM SERPL-MCNC: 4.3 MMOL/L — SIGNIFICANT CHANGE UP (ref 3.5–5.3)
POTASSIUM SERPL-SCNC: 3.8 MMOL/L — SIGNIFICANT CHANGE UP (ref 3.5–5.3)
POTASSIUM SERPL-SCNC: 4.3 MMOL/L — SIGNIFICANT CHANGE UP (ref 3.5–5.3)
PROT SERPL-MCNC: 7.5 G/DL — SIGNIFICANT CHANGE UP (ref 6–8.3)
PROTHROM AB SERPL-ACNC: 10.9 SEC — SIGNIFICANT CHANGE UP (ref 10.5–13.4)
RBC # BLD: 4.14 M/UL — SIGNIFICANT CHANGE UP (ref 3.8–5.2)
RBC # BLD: 4.42 M/UL — SIGNIFICANT CHANGE UP (ref 3.8–5.2)
RBC # FLD: 14.9 % — HIGH (ref 10.3–14.5)
RBC # FLD: 15.1 % — HIGH (ref 10.3–14.5)
SODIUM SERPL-SCNC: 138 MMOL/L — SIGNIFICANT CHANGE UP (ref 135–145)
SODIUM SERPL-SCNC: 142 MMOL/L — SIGNIFICANT CHANGE UP (ref 135–145)
TRIGL SERPL-MCNC: 144 MG/DL — SIGNIFICANT CHANGE UP
WBC # BLD: 9.51 K/UL — SIGNIFICANT CHANGE UP (ref 3.8–10.5)
WBC # BLD: 9.78 K/UL — SIGNIFICANT CHANGE UP (ref 3.8–10.5)
WBC # FLD AUTO: 9.51 K/UL — SIGNIFICANT CHANGE UP (ref 3.8–10.5)
WBC # FLD AUTO: 9.78 K/UL — SIGNIFICANT CHANGE UP (ref 3.8–10.5)

## 2022-02-23 PROCEDURE — 80048 BASIC METABOLIC PNL TOTAL CA: CPT

## 2022-02-23 PROCEDURE — 99152 MOD SED SAME PHYS/QHP 5/>YRS: CPT

## 2022-02-23 PROCEDURE — C1887: CPT

## 2022-02-23 PROCEDURE — 92921: CPT | Mod: LC

## 2022-02-23 PROCEDURE — 82550 ASSAY OF CK (CPK): CPT

## 2022-02-23 PROCEDURE — 80061 LIPID PANEL: CPT

## 2022-02-23 PROCEDURE — C1725: CPT

## 2022-02-23 PROCEDURE — 85027 COMPLETE CBC AUTOMATED: CPT

## 2022-02-23 PROCEDURE — 80053 COMPREHEN METABOLIC PANEL: CPT

## 2022-02-23 PROCEDURE — C9600: CPT | Mod: LC

## 2022-02-23 PROCEDURE — 82962 GLUCOSE BLOOD TEST: CPT

## 2022-02-23 PROCEDURE — 93005 ELECTROCARDIOGRAM TRACING: CPT

## 2022-02-23 PROCEDURE — 93010 ELECTROCARDIOGRAM REPORT: CPT

## 2022-02-23 PROCEDURE — 99153 MOD SED SAME PHYS/QHP EA: CPT

## 2022-02-23 PROCEDURE — 85025 COMPLETE CBC W/AUTO DIFF WBC: CPT

## 2022-02-23 PROCEDURE — C1753: CPT

## 2022-02-23 PROCEDURE — 92920 PRQ TRLUML C ANGIOP 1ART&/BR: CPT | Mod: RI

## 2022-02-23 PROCEDURE — 92928 PRQ TCAT PLMT NTRAC ST 1 LES: CPT | Mod: LC

## 2022-02-23 PROCEDURE — 85730 THROMBOPLASTIN TIME PARTIAL: CPT

## 2022-02-23 PROCEDURE — 83036 HEMOGLOBIN GLYCOSYLATED A1C: CPT

## 2022-02-23 PROCEDURE — C1769: CPT

## 2022-02-23 PROCEDURE — 82553 CREATINE MB FRACTION: CPT

## 2022-02-23 PROCEDURE — 85610 PROTHROMBIN TIME: CPT

## 2022-02-23 PROCEDURE — C1874: CPT

## 2022-02-23 RX ORDER — ACETAMINOPHEN 500 MG
650 TABLET ORAL ONCE
Refills: 0 | Status: COMPLETED | OUTPATIENT
Start: 2022-02-23 | End: 2022-02-23

## 2022-02-23 RX ORDER — SODIUM CHLORIDE 9 MG/ML
500 INJECTION INTRAMUSCULAR; INTRAVENOUS; SUBCUTANEOUS
Refills: 0 | Status: DISCONTINUED | OUTPATIENT
Start: 2022-02-23 | End: 2022-03-09

## 2022-02-23 RX ORDER — ASPIRIN/CALCIUM CARB/MAGNESIUM 324 MG
1 TABLET ORAL
Qty: 30 | Refills: 11
Start: 2022-02-23 | End: 2023-02-17

## 2022-02-23 RX ORDER — ASPIRIN/CALCIUM CARB/MAGNESIUM 324 MG
81 TABLET ORAL ONCE
Refills: 0 | Status: COMPLETED | OUTPATIENT
Start: 2022-02-23 | End: 2022-02-23

## 2022-02-23 RX ORDER — POTASSIUM CHLORIDE 20 MEQ
20 PACKET (EA) ORAL ONCE
Refills: 0 | Status: COMPLETED | OUTPATIENT
Start: 2022-02-23 | End: 2022-02-23

## 2022-02-23 RX ORDER — TICAGRELOR 90 MG/1
1 TABLET ORAL
Qty: 60 | Refills: 11
Start: 2022-02-23 | End: 2023-02-17

## 2022-02-23 RX ADMIN — Medication 650 MILLIGRAM(S): at 17:16

## 2022-02-23 RX ADMIN — SODIUM CHLORIDE 180 MILLILITER(S): 9 INJECTION INTRAMUSCULAR; INTRAVENOUS; SUBCUTANEOUS at 17:06

## 2022-02-23 RX ADMIN — Medication 20 MILLIEQUIVALENT(S): at 19:26

## 2022-02-23 RX ADMIN — SODIUM CHLORIDE 270 MILLILITER(S): 9 INJECTION INTRAMUSCULAR; INTRAVENOUS; SUBCUTANEOUS at 12:57

## 2022-02-23 RX ADMIN — Medication 81 MILLIGRAM(S): at 12:58

## 2022-02-23 NOTE — PROGRESS NOTE ADULT - SUBJECTIVE AND OBJECTIVE BOX
Interventional Cardiology PA Post PCI SDA Discharge Note      Patient without complaints. Ambulated and voided without difficulties    Afebrile, VSS    PRESCRIPTIONS/HOME MEDICATIONS:  Aspirin Enteric Coated 81 mg oral delayed release tablet: 1 tab(s) orally once a day  Basaglar KwikPen: 30 unit(s) subcutaneous once a day (at bedtime)  Brilinta (ticagrelor) 90 mg oral tablet: 1 tab(s) orally 2 times a day  Coreg 12.5 mg oral tablet: 1 tab(s) orally 2 times a day  Crestor 20 mg oral tablet: 1 tab(s) orally once a day  hydrALAZINE 25 mg oral tablet: 1 tab(s) orally 2 times a day  levothyroxine 150 mcg (0.15 mg) oral capsule: 1 cap(s) orally once a day  Linzess 145 mcg oral capsule: 1 cap(s) orally once a day  losartan 100 mg oral tablet: 1 tab(s) orally once a day  magnesium oxide 140 mg oral capsule: 1 cap(s) orally 3 times a day  meclizine 25 mg oral tablet: 1 tab(s) orally 2 times a day  Norvasc 10 mg oral tablet: 1 tab(s) orally once a day  omeprazole 40 mg oral delayed release capsule: 1 cap(s) orally once a day  Vitamin D3 25 mcg (1000 intl units) oral tablet: 1 tab(s) orally once a day  Zetia 10 mg oral tablet: 1 tab(s) orally once a day    CURRENT MEDICATIONS:   acetaminophen     Tablet .. 650 milliGRAM(s) Oral Once  chlorhexidine 4% Liquid 1 Application(s) Topical once  sodium chloride 0.9%. 500 milliLiter(s) IV Continuous <Continuous>  sodium chloride 0.9%. 500 milliLiter(s) IV Continuous <Continuous>    Ext:    		Right              Radial :   no hematoma,    no bleeding, dressing; C/D/I      Pulses:    intact RAD to baseline     A/P:  73 y/o Arabic speaking F, PMHx HTN, HLD, DM, CAD (last cath @ [facility] 12/22/21: m/d LAD 90% s/p EVELIO x2 with residual mLCx 80% just after severely diffused OM1 extending into larger OM2 80%, p/m Ramus 90%, RPDA & RPLs with p/m vessel 70-90%), IBS, hypothyroidism, who initially presented to Cardiologist Dr. Mckenna c/o intermittent, CCP described as dull l when walking >2 blocks and/or climbing one flight of stairs a/w GUAMAN, relieved with rest x several months.  Since last procedure, pt continues to endorse CP/GUAMAN on >2 city blocks. Per MD office note Echo (6/2021) revealed normal BiV function, trace MR/TR, EF 63% and Carotid US (5/2021) revealed mild plaque, patent flows b/l. In light of pt's risk factors, persistent CCS Angina Class II Symptoms despite medical therapy, known physiologically significant CAD,  patient presented for staged PCI of LCx & Ramus followed by rPDA/rPL at later date.    DAPT LOAD Ordered: [  ] Yes  [x] No load 2/2 pt takes Aspirin 81mg daily, last dose yesterday 2/22/2022 and takes Brilinta 90mg BID, last dose this morning, give Aspirin 81mg x 1. H/H 12.21/39.4 stable   Pre-Cath fluids Ordered: [x] Yes  BUN/Cr 26/1.27, IVF NS @ 270cc/hr x 2 hour    Cardiac cath 2/23/22: Shockwave/EVELIO x2 OM2, PTCA OM1 (small vessel), EVELIO x1 Ramus, LAD stent patent, LM no significant disease.  -Access: R radial artery s/p TR band; no hematoma/bleed.    Post cath IV NS @180cc/hr x2hrs ordered per protocol.    1.	  Follow-up with PMD/Cardiologist Mckenna in 72 hours.  2.       Post procedure labs/EKG reviewed and stable.    3.       Pt given instructions on importance of taking antiplatelet medication.    4. 	   Stable for discharge as per attending Dr. Andres after bed rest, pt voids, groin/wrist stable and 30 minutes of ambulation.  5.        Prescriptions for Aspirin/Brilinta e-prescribed and submitted to patient's pharmacy Yes  6.        Patient will continue Crestor 20mg QHS  7.       Patient will continue All Other Home Medications.  (PLEASE NOTE IF ANY CHANGES).  8.	   Discharge forms signed and copies in chart   9.       Discharge Order Entered Yes

## 2022-02-24 DIAGNOSIS — I25.118 ATHEROSCLEROTIC HEART DISEASE OF NATIVE CORONARY ARTERY WITH OTHER FORMS OF ANGINA PECTORIS: ICD-10-CM

## 2022-02-24 DIAGNOSIS — Z95.5 PRESENCE OF CORONARY ANGIOPLASTY IMPLANT AND GRAFT: ICD-10-CM

## 2022-02-24 DIAGNOSIS — I25.84 CORONARY ATHEROSCLEROSIS DUE TO CALCIFIED CORONARY LESION: ICD-10-CM

## 2022-02-24 DIAGNOSIS — R94.39 ABNORMAL RESULT OF OTHER CARDIOVASCULAR FUNCTION STUDY: ICD-10-CM

## 2022-06-20 RX ORDER — CHLORHEXIDINE GLUCONATE 213 G/1000ML
1 SOLUTION TOPICAL ONCE
Refills: 0 | Status: DISCONTINUED | OUTPATIENT
Start: 2022-08-31 | End: 2022-09-15

## 2022-06-20 NOTE — H&P ADULT - HISTORY OF PRESENT ILLNESS
Cardiologist: Dr. Andres  Escort:  Ph[facility]acy: [facility]  COVID:    Verify Meds - staged PCI    71 yo Sammarinese Speaking F with a PMH of HTN, HLD, DM, CAD s/p EVELIO x2 LCx into OM2, x1 Ramus, x1 m/dLAD (residual RPDA and RPL stenosis), CKD3 (baseline 1.1-1.3 per last admit), hypothyroidism who presented to outpatient cardiologist Dr. Holder for follow up after stents. Since procedure, patient endorses ____. Denies CP, SOB, dizziness, diaphoresis, palpitations, orthopnea/PND, BRBPR, hematuria, melena, LE swelling. Endorses compliance with DAPT. In light of patient's risk factors, CCS angina class ___ sx and known residual disease patient is referred for staged PCI of RPDA/RPL disease.     Cardiac Cath 2/22/22: EVELIO x2 to LCx into OM2, balloon angioplasty to OM1; EVELIO x1 to ramus; patent m/dLAD stents; residual RPDA and R PL 70-90% stenosis.    Echo 6/2021: normal BiV function, trace MR/TR    Cardiologist: Dr. Andres  Escort:  [facility]: [facility]  COVID:    Verify Meds - staged PCI    71 yo Comoran Speaking F with a PMH of HTN, HLD, DM, CAD s/p EVELIO x2 LCx into OM2, x1 Ramus, x1 m/dLAD (residual RPDA and RPL stenosis), CKD3 (baseline 1.1-1.3 per last admit), hypothyroidism who presented to outpatient cardiologist Dr. Holder for follow up after stents. Since procedure, patient endorses ____. Denies CP, SOB, dizziness, diaphoresis, palpitations, orthopnea/PND, BRBPR, hematuria, melena, LE swelling. Endorses compliance with DAPT. In light of patient's risk factors, CCS angina class ___ sx and known residual disease patient is referred for staged PCI of RPDA/RPL disease.     Cardiac Cath 2/23/22: EVELIO x2 to LCx into OM2, balloon angioplasty to OM1; EVELIO x1 to ramus; patent m/dLAD stents; residual RPDA and R PL 70-90% stenosis.    Echo 6/2021: normal BiV function, trace MR/TR

## 2022-08-26 VITALS
RESPIRATION RATE: 16 BRPM | DIASTOLIC BLOOD PRESSURE: 62 MMHG | HEART RATE: 62 BPM | WEIGHT: 199.96 LBS | OXYGEN SATURATION: 98 % | TEMPERATURE: 98 F | HEIGHT: 66 IN | SYSTOLIC BLOOD PRESSURE: 131 MMHG

## 2022-08-26 RX ORDER — TICAGRELOR 90 MG/1
1 TABLET ORAL
Qty: 0 | Refills: 0 | DISCHARGE

## 2022-08-26 NOTE — H&P ADULT - NSICDXPASTMEDICALHX_GEN_ALL_CORE_FT
PAST MEDICAL HISTORY:  CAD (coronary artery disease)     Diabetes     Hyperlipidemia     Hypertension     
PAST MEDICAL HISTORY:  CAD (coronary artery disease)     Diabetes     Hyperlipidemia     Hypertension

## 2022-08-26 NOTE — H&P ADULT - NSHPLABSRESULTS_GEN_ALL_CORE
EKG: EKG: NSR @ 63 BPM with 1st degree AV block without ST/T segment changes EKG: NSR @ 63 BPM with 1st degree AV block without ST/T segment changes                          10.2   9.28  )-----------( 394      ( 31 Aug 2022 12:47 )             32.3

## 2022-08-26 NOTE — H&P ADULT - ASSESSMENT
71 yo Trinidadian Speaking F with a PMH of HTN, HLD, DM, CAD s/p EVELIO x2 LCx into OM2, x1 Ramus, x1 m/dLAD (residual RPDA and RPL stenosis), CKD3 (baseline Cr 1.1-1.3 per last admit), hypothyroidism who initially presented to outpatient Cardiologist Dr. Holder c/o intermittent, CP described as dull l when walking >2 blocks and/or climbing one flight of stairs a/w GUAMAN, relieved with rest x several months.  Since last procedure, pt continues to endorse CP/GUAMAN on >2 city blocks.  In light of pt's risk factors, CCS angina class II sx, known residual disease patient is referred for staged PCI of RPDA/RPL disease.     ASA Class III    Mallampati Class III    Risks & benefits of procedure and alternative therapy have been explained to the patient including but not limited to: allergic reaction, bleeding with possible need for blood transfusion, infection, renal and vascular compromise, limb damage, arrhythmia, stroke, vessel dissection/perforation, Myocardial infarction, emergent CABG. Informed consent obtained and in chart.       Patient a candidate for sedation: Yes    Sedation Type: Moderate   73 yo Iranian Speaking F with a PMH of HTN, HLD, DM, CAD s/p EVELIO x2 LCx into OM2, x1 Ramus, x1 m/dLAD (residual RPDA and RPL stenosis), CKD3 (baseline Cr 1.1-1.3 per last admit), hypothyroidism who initially presented to outpatient Cardiologist Dr. Holder c/o intermittent, CP described as dull l when walking >2 blocks and/or climbing one flight of stairs a/w GUAMAN, relieved with rest x several months.  Since last procedure, pt continues to endorse CP/GUAMAN on >2 city blocks.  In light of pt's risk factors, CCS angina class II sx, known residual disease patient is referred for staged PCI of RPDA/RPL disease.     ASA Class III    Mallampati Class III    IV  cc bolus x1 followed by IV NS @ 75 cc/hr pre-cath fluids.     Endorses compliance with Ecotrin 81 mg PO QD & Brilinta 90 mg PO BID. Last doses of both 8/31/22 AM. No need to re-load.     Risks & benefits of procedure and alternative therapy have been explained to the patient including but not limited to: allergic reaction, bleeding with possible need for blood transfusion, infection, renal and vascular compromise, limb damage, arrhythmia, stroke, vessel dissection/perforation, Myocardial infarction, emergent CABG. Informed consent obtained and in chart.       Patient a candidate for sedation: Yes    Sedation Type: Moderate   73 yo Cymraes Speaking F with a PMH of HTN, HLD, DM, CAD s/p EVELIO x2 LCx into OM2, x1 Ramus, x1 m/dLAD (residual RPDA and RPL stenosis), CKD3 (baseline Cr 1.1-1.3 per last admit), hypothyroidism who initially presented to outpatient Cardiologist Dr. Holder c/o intermittent, CP described as dull l when walking >2 blocks and/or climbing one flight of stairs a/w GUAMAN, relieved with rest x several months.  Since last procedure, pt continues to endorse CP/GUAMAN on >2 city blocks.  In light of pt's risk factors, CCS angina class II sx, known residual disease patient is referred for staged PCI of RPDA/RPL disease.     ASA Class III    Mallampati Class III    IV  cc bolus x1 followed by IV NS @ 75 cc/hr pre-cath fluids.     Endorses compliance with Ecotrin 81 mg PO QD & Brilinta 90 mg PO BID. Last doses of both 8/31/22 AM. No need to re-load.     COVID negative 8/19/22 per [facility] Cardiology documentation, reswabbed 8/21    Risks & benefits of procedure and alternative therapy have been explained to the patient including but not limited to: allergic reaction, bleeding with possible need for blood transfusion, infection, renal and vascular compromise, limb damage, arrhythmia, stroke, vessel dissection/perforation, Myocardial infarction, emergent CABG. Informed consent obtained and in chart.       Patient a candidate for sedation: Yes    Sedation Type: Moderate   73 yo Nigerien Speaking F with a PMH of HTN, HLD, DM, CAD s/p EVELIO x2 LCx into OM2, x1 Ramus, x1 m/dLAD (residual RPDA and RPL stenosis), CKD3 (baseline Cr 1.1-1.3 per last admit), hypothyroidism who initially presented to outpatient Cardiologist Dr. Holder c/o intermittent, CP described as dull l when walking >2 blocks and/or climbing one flight of stairs a/w GUAMAN, relieved with rest x several months.  Since last procedure, pt continues to endorse CP/GUAMAN on >2 city blocks.  In light of pt's risk factors, CCS angina class II sx, known residual disease patient is referred for staged PCI of RPDA/RPL disease.     ASA Class III    Mallampati Class III    IV  cc bolus x1 followed by IV NS @ 75 cc/hr pre-cath fluids.     Endorses compliance with Ecotrin 81 mg PO QD & Brilinta 90 mg PO BID. Last doses of both 8/31/22 AM. No need to re-load.     COVID negative 8/19/22 per [facility] Cardiology documentation, reswabbed 8/31/22 (negative in HIE)    Risks & benefits of procedure and alternative therapy have been explained to the patient including but not limited to: allergic reaction, bleeding with possible need for blood transfusion, infection, renal and vascular compromise, limb damage, arrhythmia, stroke, vessel dissection/perforation, Myocardial infarction, emergent CABG. Informed consent obtained and in chart.       Patient a candidate for sedation: Yes    Sedation Type: Moderate

## 2022-08-26 NOTE — H&P ADULT - HISTORY OF PRESENT ILLNESS
Cardiologist: Dr. Andres  Escort:  Pharmacy: [facility]  COVID:    Verify Meds - staged PCI    71 yo Comoran Speaking F with a PMH of HTN, HLD, DM, CAD s/p EVELIO x2 LCx into OM2, x1 Ramus, x1 m/dLAD (residual RPDA and RPL stenosis), CKD3 (baseline 1.1-1.3 per last admit), hypothyroidism who presented to outpatient cardiologist Dr. Holder for follow up after stents. Since procedure, patient endorses ____. Denies CP, SOB, dizziness, diaphoresis, palpitations, orthopnea/PND, BRBPR, hematuria, melena, LE swelling. Endorses compliance with DAPT. In light of patient's risk factors, CCS angina class ___ sx and known residual disease patient is referred for staged PCI of RPDA/RPL disease.     Cardiac Cath 2/23/22: EVELIO x2 to LCx into OM2, balloon angioplasty to OM1; EVELIO x1 to ramus; patent m/dLAD stents; residual RPDA and R PL 70-90% stenosis.    Echo 6/2021: normal BiV function, trace MR/TR    Cardiologist: Dr. Andres  [facility]  COVID negative 8/19/22, reswabbed 8/21    Verify Meds - staged PCI    73 yo Belarusian Speaking F with a PMH of HTN, HLD, DM, CAD s/p EVELIO x2 LCx into OM2, x1 Ramus, x1 m/dLAD (residual RPDA and RPL stenosis), CKD3 (baseline Cr 1.1-1.3 per last admit), hypothyroidism who initially presented to outpatient Cardiologist Dr. Holder c/o intermittent, CP described as dull l when walking >2 blocks and/or climbing one flight of stairs a/w GUAMAN, relieved with rest x several months.  Since last procedure, pt continues to endorse CP/GUAMAN on >2 city blocks.  Echo 6/2021: normal BiV function, trace MR/TR    In light of pt's risk factors, CCS angina class II sx, known residual disease patient is referred for staged PCI of RPDA/RPL disease.       Cardiac Cath 2/23/22: EVELIO x2 to LCx into OM2, balloon angioplasty to OM1; EVELIO x1 to ramus; patent m/dLAD stents; residual RPDA and R PL 70-90% stenosis.   Cardiologist: Dr. Andres  [facility], medications verified by pt's meds she brought & pt's report  COVID negative 8/19/22, reswabbed 8/21    71 yo Swedish speaking F, PMHx HTN, HLD, DM, CAD (s/p EVELIO x2 LCx into OM2, x1 Ramus, x1 m/dLAD with residual RPDA and RPL stenosis), CKD III (baseline Cr 1.1-1.3 per last admit), hypothyroidism who initially presented to outpatient Cardiologist Dr. Holder c/o intermittent, CP described as dull l when walking >2 blocks and/or climbing one flight of stairs a/w GUAMAN, relieved with rest x several months.  Since last procedure, pt continues to endorse CP/GUAMAN on >2 city blocks.  Echo 6/2021: normal BiV function, trace MR/TR    In light of pt's risk factors, CCS angina class II sx, known residual disease patient is referred for staged PCI of RPDA/RPL disease.       Cardiac Cath 2/23/22: EVELIO x2 to LCx into OM2, balloon angioplasty to OM1; EVELIO x1 to ramus; patent m/dLAD stents; residual RPDA /RPLS 70-90% stenosis.   Cardiologist: Dr. Andres  [facility]: [facility], medications verified by pt's meds she brought & pt's report  COVID negative 8/19/22, reswabbed 8/21    73 yo Pashto speaking F, PMHx HTN, HLD, DM, CAD (s/p EVELIO x2 LCx into OM2, x1 Ramus, x1 m/dLAD with residual RPDA and RPL stenosis), CKD III (baseline Cr 1.1-1.3 per last admit), hypothyroidism who initially presented to outpatient Cardiologist Dr. Holder c/o intermittent, CP described as dull l when walking >2 blocks and/or climbing one flight of stairs a/w GUAMAN, relieved with rest x several months.  Since last procedure, pt continues to endorse CP/GUAMAN on >2 city blocks.  Echo 6/2021: normal BiV function, trace MR/TR  n light of pt's risk factors, CCS angina class II sx, known residual disease patient is referred for staged PCI of RPDA/RPL disease.       Cardiac Cath 2/23/22: EVELIO x2 to LCx into OM2, balloon angioplasty to OM1; EVELIO x1 to ramus; patent m/dLAD stents; residual RPDA /RPLS 70-90% stenosis.   Cardiologist: Dr. Andres  [facility], medications verified by pt's meds she brought & pt's report  COVID negative 8/19/22 per [facility] Cardiology documentation, reswabbed 8/21    73 yo Wolof speaking F, PMHx HTN, HLD, DM, CAD (s/p EVELIO x2 LCx into OM2, x1 Ramus, x1 m/dLAD with residual RPDA and RPL stenosis), CKD III (baseline Cr 1.1-1.3 per last admit), hypothyroidism who initially presented to outpatient Cardiologist Dr. Holder c/o intermittent, CP described as dull l when walking >2 blocks and/or climbing one flight of stairs a/w GUAMAN, relieved with rest x several months.  Since last procedure, pt continues to endorse CP/GUAMAN on >2 city blocks.  Echo 6/2021: normal BiV function, trace MR/TR  n light of pt's risk factors, CCS angina class II sx, known residual disease patient is referred for staged PCI of RPDA/RPL disease.       Cardiac Cath 2/23/22: EVELIO x2 to LCx into OM2, balloon angioplasty to OM1; EVELIO x1 to ramus; patent m/dLAD stents; residual RPDA /RPLS 70-90% stenosis.   Cardiologist: Dr. Andres  Ph[facility]: [facility], medications verified by pt's meds she brought & pt's report  COVID negative 8/19/22 per [facility] Cardiology documentation, reswabbed 8/31/22 (negative in HIE)    73 yo Frisian speaking F, PMHx HTN, HLD, DM, CAD (s/p EVELIO x2 LCx into OM2, x1 Ramus, x1 m/dLAD with residual RPDA and RPL stenosis), CKD III (baseline Cr 1.1-1.3 per last admit), hypothyroidism who initially presented to outpatient Cardiologist Dr. Holder c/o intermittent, CP described as dull l when walking >2 blocks and/or climbing one flight of stairs a/w GUAMAN, relieved with rest x several months.  Since last procedure, pt continues to endorse CP/GUAMAN on >2 city blocks.  Echo 6/2021: normal BiV function, trace MR/TR  n light of pt's risk factors, CCS angina class II sx, known residual disease patient is referred for staged PCI of RPDA/RPL disease.       Cardiac Cath 2/23/22: EVELIO x2 to LCx into OM2, balloon angioplasty to OM1; EVELIO x1 to ramus; patent m/dLAD stents; residual RPDA /RPLS 70-90% stenosis.

## 2022-08-26 NOTE — H&P ADULT - CARDIOVASCULAR
normal/regular rate and rhythm/S1 S2 present/no murmur normal/regular rate and rhythm/S1 S2 present/no murmur/vascular

## 2022-08-26 NOTE — H&P ADULT - NSICDXPASTSURGICALHX_GEN_ALL_CORE_FT
PAST SURGICAL HISTORY:  H/O myomectomy     S/P      
PAST SURGICAL HISTORY:  H/O myomectomy     S/P

## 2022-08-31 ENCOUNTER — OUTPATIENT (OUTPATIENT)
Dept: OUTPATIENT SERVICES | Facility: HOSPITAL | Age: 72
LOS: 1 days | Discharge: HOME | End: 2022-08-31
Payer: MEDICARE

## 2022-08-31 DIAGNOSIS — Z98.890 OTHER SPECIFIED POSTPROCEDURAL STATES: Chronic | ICD-10-CM

## 2022-08-31 DIAGNOSIS — Z98.891 HISTORY OF UTERINE SCAR FROM PREVIOUS SURGERY: Chronic | ICD-10-CM

## 2022-08-31 LAB
A1C WITH ESTIMATED AVERAGE GLUCOSE RESULT: 6 % — HIGH (ref 4–5.6)
ALBUMIN SERPL ELPH-MCNC: 4.4 G/DL — SIGNIFICANT CHANGE UP (ref 3.3–5)
ALP SERPL-CCNC: 53 U/L — SIGNIFICANT CHANGE UP (ref 40–120)
ALT FLD-CCNC: 20 U/L — SIGNIFICANT CHANGE UP (ref 10–45)
ANION GAP SERPL CALC-SCNC: 12 MMOL/L — SIGNIFICANT CHANGE UP (ref 5–17)
ANION GAP SERPL CALC-SCNC: 9 MMOL/L — SIGNIFICANT CHANGE UP (ref 5–17)
APTT BLD: 27.8 SEC — SIGNIFICANT CHANGE UP (ref 27.5–35.5)
AST SERPL-CCNC: 23 U/L — SIGNIFICANT CHANGE UP (ref 10–40)
BASOPHILS # BLD AUTO: 0.05 K/UL — SIGNIFICANT CHANGE UP (ref 0–0.2)
BASOPHILS NFR BLD AUTO: 0.5 % — SIGNIFICANT CHANGE UP (ref 0–2)
BILIRUB SERPL-MCNC: 0.2 MG/DL — SIGNIFICANT CHANGE UP (ref 0.2–1.2)
BUN SERPL-MCNC: 23 MG/DL — SIGNIFICANT CHANGE UP (ref 7–23)
BUN SERPL-MCNC: 24 MG/DL — HIGH (ref 7–23)
CALCIUM SERPL-MCNC: 8.4 MG/DL — SIGNIFICANT CHANGE UP (ref 8.4–10.5)
CALCIUM SERPL-MCNC: 9.1 MG/DL — SIGNIFICANT CHANGE UP (ref 8.4–10.5)
CHLORIDE SERPL-SCNC: 106 MMOL/L — SIGNIFICANT CHANGE UP (ref 96–108)
CHLORIDE SERPL-SCNC: 106 MMOL/L — SIGNIFICANT CHANGE UP (ref 96–108)
CHOLEST SERPL-MCNC: 88 MG/DL — SIGNIFICANT CHANGE UP
CK MB CFR SERPL CALC: 1.9 NG/ML — SIGNIFICANT CHANGE UP (ref 0–6.7)
CK SERPL-CCNC: 112 U/L — SIGNIFICANT CHANGE UP (ref 25–170)
CO2 SERPL-SCNC: 22 MMOL/L — SIGNIFICANT CHANGE UP (ref 22–31)
CO2 SERPL-SCNC: 24 MMOL/L — SIGNIFICANT CHANGE UP (ref 22–31)
CREAT SERPL-MCNC: 1.43 MG/DL — HIGH (ref 0.5–1.3)
CREAT SERPL-MCNC: 1.51 MG/DL — HIGH (ref 0.5–1.3)
EGFR: 36 ML/MIN/1.73M2 — LOW
EGFR: 39 ML/MIN/1.73M2 — LOW
EOSINOPHIL # BLD AUTO: 0.4 K/UL — SIGNIFICANT CHANGE UP (ref 0–0.5)
EOSINOPHIL NFR BLD AUTO: 4.3 % — SIGNIFICANT CHANGE UP (ref 0–6)
ESTIMATED AVERAGE GLUCOSE: 126 MG/DL — HIGH (ref 68–114)
GLUCOSE BLDC GLUCOMTR-MCNC: 60 MG/DL — LOW (ref 70–99)
GLUCOSE SERPL-MCNC: 227 MG/DL — HIGH (ref 70–99)
GLUCOSE SERPL-MCNC: 88 MG/DL — SIGNIFICANT CHANGE UP (ref 70–99)
HCT VFR BLD CALC: 28.5 % — LOW (ref 34.5–45)
HCT VFR BLD CALC: 32.3 % — LOW (ref 34.5–45)
HDLC SERPL-MCNC: 45 MG/DL — LOW
HGB BLD-MCNC: 10.2 G/DL — LOW (ref 11.5–15.5)
HGB BLD-MCNC: 9 G/DL — LOW (ref 11.5–15.5)
IMM GRANULOCYTES NFR BLD AUTO: 0.2 % — SIGNIFICANT CHANGE UP (ref 0–1.5)
INR BLD: 0.93 — SIGNIFICANT CHANGE UP (ref 0.88–1.16)
ISTAT ACTK (ACTIVATED CLOTTING TIME KAOLIN): 288 SEC — HIGH (ref 74–137)
LIPID PNL WITH DIRECT LDL SERPL: 23 MG/DL — SIGNIFICANT CHANGE UP
LYMPHOCYTES # BLD AUTO: 2.61 K/UL — SIGNIFICANT CHANGE UP (ref 1–3.3)
LYMPHOCYTES # BLD AUTO: 28.1 % — SIGNIFICANT CHANGE UP (ref 13–44)
MAGNESIUM SERPL-MCNC: 1.7 MG/DL — SIGNIFICANT CHANGE UP (ref 1.6–2.6)
MCHC RBC-ENTMCNC: 29.6 PG — SIGNIFICANT CHANGE UP (ref 27–34)
MCHC RBC-ENTMCNC: 29.7 PG — SIGNIFICANT CHANGE UP (ref 27–34)
MCHC RBC-ENTMCNC: 31.6 GM/DL — LOW (ref 32–36)
MCHC RBC-ENTMCNC: 31.6 GM/DL — LOW (ref 32–36)
MCV RBC AUTO: 93.8 FL — SIGNIFICANT CHANGE UP (ref 80–100)
MCV RBC AUTO: 93.9 FL — SIGNIFICANT CHANGE UP (ref 80–100)
MONOCYTES # BLD AUTO: 0.49 K/UL — SIGNIFICANT CHANGE UP (ref 0–0.9)
MONOCYTES NFR BLD AUTO: 5.3 % — SIGNIFICANT CHANGE UP (ref 2–14)
NEUTROPHILS # BLD AUTO: 5.71 K/UL — SIGNIFICANT CHANGE UP (ref 1.8–7.4)
NEUTROPHILS NFR BLD AUTO: 61.6 % — SIGNIFICANT CHANGE UP (ref 43–77)
NON HDL CHOLESTEROL: 43 MG/DL — SIGNIFICANT CHANGE UP
NRBC # BLD: 0 /100 WBCS — SIGNIFICANT CHANGE UP (ref 0–0)
NRBC # BLD: 0 /100 WBCS — SIGNIFICANT CHANGE UP (ref 0–0)
PLATELET # BLD AUTO: 344 K/UL — SIGNIFICANT CHANGE UP (ref 150–400)
PLATELET # BLD AUTO: 394 K/UL — SIGNIFICANT CHANGE UP (ref 150–400)
POTASSIUM SERPL-MCNC: 4.7 MMOL/L — SIGNIFICANT CHANGE UP (ref 3.5–5.3)
POTASSIUM SERPL-MCNC: 4.9 MMOL/L — SIGNIFICANT CHANGE UP (ref 3.5–5.3)
POTASSIUM SERPL-SCNC: 4.7 MMOL/L — SIGNIFICANT CHANGE UP (ref 3.5–5.3)
POTASSIUM SERPL-SCNC: 4.9 MMOL/L — SIGNIFICANT CHANGE UP (ref 3.5–5.3)
PROT SERPL-MCNC: 7 G/DL — SIGNIFICANT CHANGE UP (ref 6–8.3)
PROTHROM AB SERPL-ACNC: 11 SEC — SIGNIFICANT CHANGE UP (ref 10.5–13.4)
RBC # BLD: 3.04 M/UL — LOW (ref 3.8–5.2)
RBC # BLD: 3.44 M/UL — LOW (ref 3.8–5.2)
RBC # FLD: 13.9 % — SIGNIFICANT CHANGE UP (ref 10.3–14.5)
RBC # FLD: 14 % — SIGNIFICANT CHANGE UP (ref 10.3–14.5)
SARS-COV-2 RNA SPEC QL NAA+PROBE: SIGNIFICANT CHANGE UP
SODIUM SERPL-SCNC: 139 MMOL/L — SIGNIFICANT CHANGE UP (ref 135–145)
SODIUM SERPL-SCNC: 140 MMOL/L — SIGNIFICANT CHANGE UP (ref 135–145)
TRIGL SERPL-MCNC: 99 MG/DL — SIGNIFICANT CHANGE UP
WBC # BLD: 9.11 K/UL — SIGNIFICANT CHANGE UP (ref 3.8–10.5)
WBC # BLD: 9.28 K/UL — SIGNIFICANT CHANGE UP (ref 3.8–10.5)
WBC # FLD AUTO: 9.11 K/UL — SIGNIFICANT CHANGE UP (ref 3.8–10.5)
WBC # FLD AUTO: 9.28 K/UL — SIGNIFICANT CHANGE UP (ref 3.8–10.5)

## 2022-08-31 PROCEDURE — 93010 ELECTROCARDIOGRAM REPORT: CPT

## 2022-08-31 PROCEDURE — 85730 THROMBOPLASTIN TIME PARTIAL: CPT

## 2022-08-31 PROCEDURE — C1887: CPT

## 2022-08-31 PROCEDURE — C1874: CPT

## 2022-08-31 PROCEDURE — 93005 ELECTROCARDIOGRAM TRACING: CPT

## 2022-08-31 PROCEDURE — 92978 ENDOLUMINL IVUS OCT C 1ST: CPT | Mod: 26,RC

## 2022-08-31 PROCEDURE — C1894: CPT

## 2022-08-31 PROCEDURE — C1753: CPT

## 2022-08-31 PROCEDURE — C1725: CPT

## 2022-08-31 PROCEDURE — C1769: CPT

## 2022-08-31 PROCEDURE — 80053 COMPREHEN METABOLIC PANEL: CPT

## 2022-08-31 PROCEDURE — 80061 LIPID PANEL: CPT

## 2022-08-31 PROCEDURE — 83735 ASSAY OF MAGNESIUM: CPT

## 2022-08-31 PROCEDURE — 82550 ASSAY OF CK (CPK): CPT

## 2022-08-31 PROCEDURE — 82962 GLUCOSE BLOOD TEST: CPT

## 2022-08-31 PROCEDURE — 92978 ENDOLUMINL IVUS OCT C 1ST: CPT | Mod: RC

## 2022-08-31 PROCEDURE — 85610 PROTHROMBIN TIME: CPT

## 2022-08-31 PROCEDURE — 85025 COMPLETE CBC W/AUTO DIFF WBC: CPT

## 2022-08-31 PROCEDURE — 99152 MOD SED SAME PHYS/QHP 5/>YRS: CPT

## 2022-08-31 PROCEDURE — 85347 COAGULATION TIME ACTIVATED: CPT

## 2022-08-31 PROCEDURE — 93010 ELECTROCARDIOGRAM REPORT: CPT | Mod: 77

## 2022-08-31 PROCEDURE — 85027 COMPLETE CBC AUTOMATED: CPT

## 2022-08-31 PROCEDURE — 80048 BASIC METABOLIC PNL TOTAL CA: CPT

## 2022-08-31 PROCEDURE — C9600: CPT | Mod: RC

## 2022-08-31 PROCEDURE — 92928 PRQ TCAT PLMT NTRAC ST 1 LES: CPT | Mod: RC

## 2022-08-31 PROCEDURE — 87635 SARS-COV-2 COVID-19 AMP PRB: CPT

## 2022-08-31 PROCEDURE — 83036 HEMOGLOBIN GLYCOSYLATED A1C: CPT

## 2022-08-31 PROCEDURE — 82553 CREATINE MB FRACTION: CPT

## 2022-08-31 RX ORDER — INSULIN GLARGINE 100 [IU]/ML
30 INJECTION, SOLUTION SUBCUTANEOUS
Qty: 0 | Refills: 0 | DISCHARGE

## 2022-08-31 RX ORDER — SEMAGLUTIDE 0.68 MG/ML
0 INJECTION, SOLUTION SUBCUTANEOUS
Qty: 0 | Refills: 0 | DISCHARGE

## 2022-08-31 RX ORDER — SODIUM CHLORIDE 9 MG/ML
500 INJECTION INTRAMUSCULAR; INTRAVENOUS; SUBCUTANEOUS
Refills: 0 | Status: DISCONTINUED | OUTPATIENT
Start: 2022-08-31 | End: 2022-08-31

## 2022-08-31 RX ORDER — CHOLECALCIFEROL (VITAMIN D3) 125 MCG
1 CAPSULE ORAL
Qty: 0 | Refills: 0 | DISCHARGE

## 2022-08-31 RX ORDER — MECLIZINE HCL 12.5 MG
1 TABLET ORAL
Qty: 0 | Refills: 0 | DISCHARGE

## 2022-08-31 RX ORDER — INSULIN GLARGINE 100 [IU]/ML
23 INJECTION, SOLUTION SUBCUTANEOUS
Qty: 0 | Refills: 0 | DISCHARGE

## 2022-08-31 RX ORDER — OMEPRAZOLE 10 MG/1
1 CAPSULE, DELAYED RELEASE ORAL
Qty: 0 | Refills: 0 | DISCHARGE

## 2022-08-31 RX ORDER — KETOTIFEN FUMARATE 0.34 MG/ML
1 SOLUTION OPHTHALMIC
Qty: 0 | Refills: 0 | DISCHARGE

## 2022-08-31 RX ORDER — AMLODIPINE BESYLATE 2.5 MG/1
1 TABLET ORAL
Qty: 0 | Refills: 0 | DISCHARGE

## 2022-08-31 RX ORDER — CARVEDILOL PHOSPHATE 80 MG/1
1 CAPSULE, EXTENDED RELEASE ORAL
Qty: 0 | Refills: 0 | DISCHARGE

## 2022-08-31 RX ORDER — LINACLOTIDE 145 UG/1
1 CAPSULE, GELATIN COATED ORAL
Qty: 0 | Refills: 0 | DISCHARGE

## 2022-08-31 RX ORDER — ASPIRIN/CALCIUM CARB/MAGNESIUM 324 MG
1 TABLET ORAL
Qty: 30 | Refills: 11
Start: 2022-08-31 | End: 2023-08-25

## 2022-08-31 RX ORDER — EMPAGLIFLOZIN 10 MG/1
1 TABLET, FILM COATED ORAL
Qty: 0 | Refills: 0 | DISCHARGE

## 2022-08-31 RX ORDER — TICAGRELOR 90 MG/1
1 TABLET ORAL
Qty: 60 | Refills: 11
Start: 2022-08-31 | End: 2023-08-25

## 2022-08-31 RX ORDER — EZETIMIBE 10 MG/1
1 TABLET ORAL
Qty: 0 | Refills: 0 | DISCHARGE

## 2022-08-31 RX ORDER — LEVOTHYROXINE SODIUM 125 MCG
1 TABLET ORAL
Qty: 0 | Refills: 0 | DISCHARGE

## 2022-08-31 RX ORDER — MAGNESIUM SULFATE 500 MG/ML
2 VIAL (ML) INJECTION ONCE
Refills: 0 | Status: DISCONTINUED | OUTPATIENT
Start: 2022-08-31 | End: 2022-08-31

## 2022-08-31 RX ORDER — MAGNESIUM OXIDE 400 MG ORAL TABLET 241.3 MG
400 TABLET ORAL ONCE
Refills: 0 | Status: COMPLETED | OUTPATIENT
Start: 2022-08-31 | End: 2022-08-31

## 2022-08-31 RX ORDER — SODIUM CHLORIDE 9 MG/ML
250 INJECTION INTRAMUSCULAR; INTRAVENOUS; SUBCUTANEOUS ONCE
Refills: 0 | Status: COMPLETED | OUTPATIENT
Start: 2022-08-31 | End: 2022-08-31

## 2022-08-31 RX ORDER — MAGNESIUM OXIDE 400 MG ORAL TABLET 241.3 MG
1 TABLET ORAL
Qty: 0 | Refills: 0 | DISCHARGE

## 2022-08-31 RX ORDER — HYDRALAZINE HCL 50 MG
1 TABLET ORAL
Qty: 0 | Refills: 0 | DISCHARGE

## 2022-08-31 RX ORDER — KETOCONAZOLE 20 MG/G
1 AEROSOL, FOAM TOPICAL
Qty: 0 | Refills: 0 | DISCHARGE

## 2022-08-31 RX ORDER — LOSARTAN POTASSIUM 100 MG/1
1 TABLET, FILM COATED ORAL
Qty: 0 | Refills: 0 | DISCHARGE

## 2022-08-31 RX ORDER — ROSUVASTATIN CALCIUM 5 MG/1
1 TABLET ORAL
Qty: 0 | Refills: 0 | DISCHARGE

## 2022-08-31 RX ORDER — SODIUM CHLORIDE 9 MG/ML
500 INJECTION INTRAMUSCULAR; INTRAVENOUS; SUBCUTANEOUS
Refills: 0 | Status: DISCONTINUED | OUTPATIENT
Start: 2022-08-31 | End: 2022-09-15

## 2022-08-31 RX ADMIN — SODIUM CHLORIDE 1000 MILLILITER(S): 9 INJECTION INTRAMUSCULAR; INTRAVENOUS; SUBCUTANEOUS at 14:14

## 2022-08-31 RX ADMIN — MAGNESIUM OXIDE 400 MG ORAL TABLET 400 MILLIGRAM(S): 241.3 TABLET ORAL at 21:08

## 2022-08-31 RX ADMIN — SODIUM CHLORIDE 180 MILLILITER(S): 9 INJECTION INTRAMUSCULAR; INTRAVENOUS; SUBCUTANEOUS at 18:17

## 2022-08-31 NOTE — PROGRESS NOTE ADULT - SUBJECTIVE AND OBJECTIVE BOX
Interventional Cardiology PA Post PCI SDA Discharge Note      Patient without complaints. Ambulated and voided without difficulties    Afebrile, VSS    Ext:    		  		Right    Radial :   NO hematoma,   NO  bleeding, dressing; C/D/I      Pulses:    intact RAD to baseline     A/P:  s/p PCI:     73 yo Croatian speaking F, PMHx HTN, HLD, DM, CAD (s/p EVELIO x2 LCx into OM2, x1 Ramus, x1 m/dLAD with residual RPDA and RPL stenosis), CKD III (baseline Cr 1.1-1.3 per last admit), hypothyroidism who initially presented to outpatient Cardiologist Dr. Holder c/o CCS angina class II sx, known residual disease patient is referred for staged PCI of RPDA/RPL disease.  Patient is s/p Cath 8/31 receiving a IVUS guided EVELIO to RPLS, residual disease diffuse.  stable for discharge home.     1.	Follow-up with PMD/Cardiologist __Siu_________ in 72 hours.  2.                 Post procedure labs/EKG reviewed and stable.    3.                 Pt given instructions on importance of taking antiplatelet medication.    4. 	Stable for discharge as per attending  _Siu________ after bed rest, pt voids, groin/wrist stable and 30 minutes of ambulation.  5.                 Prescriptions for Aspirin/Brilinta Refills E prescribed to pharmacy of choice.   6.                 Patient will continue Crestor 20mg QHS  7.	Discharge forms signed and copies in chart     Interventional Cardiology PA Post PCI SDA Discharge Note      Patient without complaints. Ambulated and voided without difficulties    Afebrile, VSS    Ext:    		  		Right    Radial :   NO hematoma,   NO  bleeding, dressing; C/D/I      Pulses:    intact RAD to baseline     A/P:  s/p PCI:     73 yo Malay speaking F, PMHx HTN, HLD, DM, CAD (s/p EVELIO x2 LCx into OM2, x1 Ramus, x1 m/dLAD with residual RPDA and RPL stenosis), CKD III (baseline Cr 1.1-1.3 per last admit), hypothyroidism who initially presented to outpatient Cardiologist Dr. Holder c/o CCS angina class II sx, known residual disease patient is referred for staged PCI of RPDA/RPL disease.  Patient is s/p Cath 8/31 receiving a IVUS guided EVELIO to RPLS, residual disease diffuse.  stable for discharge home.     1.	Follow-up with PMD/Cardiologist __Siu_________ in 72 hours.  2.                 Post procedure labs/EKG reviewed and stable.    3.                 Pt given instructions on importance of taking antiplatelet medication.    4. 	Stable for discharge as per attending  _Siu________ after bed rest, pt voids, groin/wrist stable and 30 minutes of ambulation.  5.                 Prescriptions for Aspirin/Brilinta Refills E prescribed to pharmacy of choice.   6.                 Patient will continue Crestor 20mg QHS  7.          Patient's magnesium 1.7. Patient given Magnesium 400 mg PO x 1 and instructed to take additional Magnesium 400 mg PO in AM.  7.	Discharge forms signed and copies in chart

## 2022-08-31 NOTE — CHART NOTE - NSCHARTNOTEFT_GEN_A_CORE
Called to pt's bedside 2/2 new area of ecchymosis to R medial forearm. No hematoma. Radial pulse intact. Dr. Kelly evaluated. Can loosen radial bands. Plan for continued monitoring.

## 2022-09-01 DIAGNOSIS — R94.39 ABNORMAL RESULT OF OTHER CARDIOVASCULAR FUNCTION STUDY: ICD-10-CM

## 2022-09-01 DIAGNOSIS — I25.110 ATHEROSCLEROTIC HEART DISEASE OF NATIVE CORONARY ARTERY WITH UNSTABLE ANGINA PECTORIS: ICD-10-CM

## 2024-10-15 NOTE — H&P ADULT - SPO2 (%)
For information on Fall & Injury Prevention, visit: [email or web address] OR  [email or web address] OR  https://www.cdc.gov/steadi/patient.html
99